# Patient Record
Sex: FEMALE
[De-identification: names, ages, dates, MRNs, and addresses within clinical notes are randomized per-mention and may not be internally consistent; named-entity substitution may affect disease eponyms.]

---

## 2018-05-28 ENCOUNTER — HOSPITAL ENCOUNTER (EMERGENCY)
Dept: HOSPITAL 25 - UCEAST | Age: 28
Discharge: HOME | End: 2018-05-28
Payer: COMMERCIAL

## 2018-05-28 VITALS — DIASTOLIC BLOOD PRESSURE: 72 MMHG | SYSTOLIC BLOOD PRESSURE: 123 MMHG

## 2018-05-28 DIAGNOSIS — H60.92: Primary | ICD-10-CM

## 2018-05-28 PROCEDURE — 99202 OFFICE O/P NEW SF 15 MIN: CPT

## 2018-05-28 PROCEDURE — G0463 HOSPITAL OUTPT CLINIC VISIT: HCPCS

## 2018-05-28 NOTE — UC
Ear Complaint HPI





- HPI Summary


HPI Summary: 





27 yo female presents with left ear pain. 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 

swimmer and developed ear pain 5-6 days ago. Saw [facility] on 5/25 and was 

dx'd with an outer ear infection and placed on cortisporin ear drops. Has been 

using them for 2 days and has experienced no relief. Today is having 

significant pain to left ear and clear drainage. Denies fever, chills.








- History of Current Complaint


Stated Complaint: EAR PAIN


Time Seen by Provider: 05/28/18 08:57


Hx Obtained From: Patient


Onset/Duration: Sudden Onset


Severity Initially: Moderate


Severity Currently: Severe


Pain Intensity: 9


Pain Scale Used: 0-10 Numeric





- Allergies/Home Medications


Allergies/Adverse Reactions: 


 Allergies











Allergy/AdvReac Type Severity Reaction Status Date / Time


 


No Known Allergies Allergy   Verified 05/28/18 08:58











Home Medications: 


 Home Medications





Ibuprofen TAB* [Motrin TAB* 600 MG] 600 mg PO Q6H PRN 05/28/18 [History 

Confirmed 05/28/18]


Neomyc/Polym/HC 1% OTIC SUSP* [Cortisporin Otic Susp 1%*] 4 drop LEFT EAR QID 05 /28/18 [History Confirmed 05/28/18]











PMH/Surg Hx/FS Hx/Imm Hx





- Additional Past Medical History


Additional PMH: 





None


Previously Healthy: Yes





- Surgical History


Surgical History: None





- Family History


Known Family History: Positive: None





- Social History


Occupation: Student


Lives: With Family


Alcohol Use: Occasionally


Substance Use Type: None


Smoking Status (MU): Never Smoked Tobacco





Review of Systems


Constitutional: Negative


Skin: Negative


Eyes: Negative


ENT: Ear Ache


Respiratory: Negative


Cardiovascular: Negative


Neurovascular: Negative


Neurological: Negative


Psychological: Negative


All Other Systems Reviewed And Are Negative: Yes





Physical Exam





- Summary


Physical Exam Summary: 





GENERAL: NAD. WDWN. No pain distress.


SKIN: No rashes, sores, lesions, or open wounds.


HEENT:


            Head: AT/NC


            Eyes:  EOM intact. Conjunctiva clear without inflammation or 

discharge.


            Ears: Hearing grossly normal. RIGHT TM occluded by cerumen No canal 

edema or drainage. LEFT TM: Moderate canal edema with moderate clear/white 

drainage. Pinna significantly TTP. No erythema.


            Nose: Nasal mucosa pink and moist. NTTP maxillary and frontal 

sinus. 


            Throat: Posterior oropharynx without exudates, erythema, or 

tonsillar enlargement.  Uvula midline.


NECK: Supple. Nontender. No lymphadenopathy. 


CHEST:  CTAB. No r/r/w. No accessory muscle use. Breathing comfortably and in 

no distress.


CV:  RRR. Without m/r/g. Pulses intact. Brisk cap refill.


NEURO: Alert. CN II-XII grossly intact.


PSYCH: Age appropriate behavior.


Triage Information Reviewed: Yes


Vital Signs: 





Vital Signs:











Temp Pulse Resp BP Pulse Ox


 


 96.7 F   85   18   123/72   99 


 


 05/28/18 09:01  05/28/18 09:01  05/28/18 09:01  05/28/18 09:01  05/28/18 09:01














Ear Complaint Course/Dx





- Course


Course Of Treatment: Otitis externa. Switched to Ciprodex f/u if no relief 

within 48 hours





- Differential Dx/Diagnosis


Provider Diagnoses: Otitis externa left





Discharge





- Sign-Out/Discharge


Documenting (check all that apply): Discharge/Admit/Transfer





- Discharge Plan


Condition: Stable


Disposition: HOME


Prescriptions: 


Ciproflox/Dexameth OTIC.SUSP* [Ciprodex OTIC.SUSP*] 1 drop LEFT EAR QID #1 btl


Patient Education Materials:  Otitis Externa (DC)


Referrals: 


No Primary Care Phys,NOPCP [Primary Care Provider] - 


Additional Instructions: 


If you develop a fever, shortness of breath, chest pain, new or worsening 

symptoms - please call your PCP or go to the ED.


 








- Billing Disposition and Condition


Condition: STABLE


Disposition: HOME